# Patient Record
Sex: MALE | Race: WHITE | Employment: OTHER | URBAN - METROPOLITAN AREA
[De-identification: names, ages, dates, MRNs, and addresses within clinical notes are randomized per-mention and may not be internally consistent; named-entity substitution may affect disease eponyms.]

---

## 2017-11-09 ENCOUNTER — HOSPITAL ENCOUNTER (INPATIENT)
Age: 37
LOS: 2 days | Discharge: HOME | DRG: 773 | End: 2017-11-11
Attending: PSYCHIATRY & NEUROLOGY | Admitting: PSYCHIATRY & NEUROLOGY
Payer: MEDICAID

## 2017-11-09 PROBLEM — F19.959 SUBSTANCE OR MEDICATION-INDUCED PSYCHOTIC DISORDER (HCC): Status: ACTIVE | Noted: 2017-11-09

## 2017-11-09 LAB
ALBUMIN SERPL-MCNC: 4.3 G/DL (ref 3.5–5.2)
ALBUMIN/GLOBULIN RATIO: ABNORMAL (ref 1–2.5)
ALP BLD-CCNC: 75 U/L (ref 40–129)
ALT SERPL-CCNC: 22 U/L (ref 5–41)
ANION GAP SERPL CALCULATED.3IONS-SCNC: 10 MMOL/L (ref 9–17)
AST SERPL-CCNC: 27 U/L
BILIRUB SERPL-MCNC: 0.63 MG/DL (ref 0.3–1.2)
BUN BLDV-MCNC: 11 MG/DL (ref 6–20)
BUN/CREAT BLD: ABNORMAL (ref 9–20)
CALCIUM SERPL-MCNC: 9.4 MG/DL (ref 8.6–10.4)
CHLORIDE BLD-SCNC: 103 MMOL/L (ref 98–107)
CO2: 30 MMOL/L (ref 20–31)
CREAT SERPL-MCNC: 0.69 MG/DL (ref 0.7–1.2)
GFR AFRICAN AMERICAN: >60 ML/MIN
GFR NON-AFRICAN AMERICAN: >60 ML/MIN
GFR SERPL CREATININE-BSD FRML MDRD: ABNORMAL ML/MIN/{1.73_M2}
GFR SERPL CREATININE-BSD FRML MDRD: ABNORMAL ML/MIN/{1.73_M2}
GLUCOSE BLD-MCNC: 89 MG/DL (ref 70–99)
POTASSIUM SERPL-SCNC: 4.8 MMOL/L (ref 3.7–5.3)
SODIUM BLD-SCNC: 143 MMOL/L (ref 135–144)
THYROXINE, FREE: 1.23 NG/DL (ref 0.93–1.7)
TOTAL PROTEIN: 7.8 G/DL (ref 6.4–8.3)
TSH SERPL DL<=0.05 MIU/L-ACNC: 0.87 MIU/L (ref 0.3–5)

## 2017-11-09 PROCEDURE — 80053 COMPREHEN METABOLIC PANEL: CPT

## 2017-11-09 PROCEDURE — 36415 COLL VENOUS BLD VENIPUNCTURE: CPT

## 2017-11-09 PROCEDURE — 84443 ASSAY THYROID STIM HORMONE: CPT

## 2017-11-09 PROCEDURE — 84439 ASSAY OF FREE THYROXINE: CPT

## 2017-11-09 PROCEDURE — 6370000000 HC RX 637 (ALT 250 FOR IP): Performed by: PSYCHIATRY & NEUROLOGY

## 2017-11-09 PROCEDURE — 93005 ELECTROCARDIOGRAM TRACING: CPT

## 2017-11-09 PROCEDURE — 1240000000 HC EMOTIONAL WELLNESS R&B

## 2017-11-09 PROCEDURE — 84481 FREE ASSAY (FT-3): CPT

## 2017-11-09 RX ORDER — NICOTINE 21 MG/24HR
1 PATCH, TRANSDERMAL 24 HOURS TRANSDERMAL DAILY
Status: DISCONTINUED | OUTPATIENT
Start: 2017-11-09 | End: 2017-11-10 | Stop reason: ALTCHOICE

## 2017-11-09 RX ORDER — BUPRENORPHINE AND NALOXONE 8; 2 MG/1; MG/1
1 FILM, SOLUBLE BUCCAL; SUBLINGUAL 2 TIMES DAILY
Status: ON HOLD | COMMUNITY
End: 2017-11-11

## 2017-11-09 RX ORDER — NICOTINE 21 MG/24HR
1 PATCH, TRANSDERMAL 24 HOURS TRANSDERMAL DAILY
Status: DISCONTINUED | OUTPATIENT
Start: 2017-11-09 | End: 2017-11-09

## 2017-11-09 RX ORDER — HYDROXYZINE HYDROCHLORIDE 25 MG/1
25 TABLET, FILM COATED ORAL 3 TIMES DAILY PRN
Status: DISCONTINUED | OUTPATIENT
Start: 2017-11-09 | End: 2017-11-11 | Stop reason: HOSPADM

## 2017-11-09 RX ORDER — ACETAMINOPHEN 325 MG/1
325 TABLET ORAL EVERY 8 HOURS PRN
Status: DISCONTINUED | OUTPATIENT
Start: 2017-11-09 | End: 2017-11-09

## 2017-11-09 RX ORDER — MAGNESIUM HYDROXIDE/ALUMINUM HYDROXICE/SIMETHICONE 120; 1200; 1200 MG/30ML; MG/30ML; MG/30ML
30 SUSPENSION ORAL 3 TIMES DAILY PRN
Status: DISCONTINUED | OUTPATIENT
Start: 2017-11-09 | End: 2017-11-11 | Stop reason: HOSPADM

## 2017-11-09 RX ORDER — HYDROXYZINE HYDROCHLORIDE 25 MG/1
25 TABLET, FILM COATED ORAL 3 TIMES DAILY PRN
Status: DISCONTINUED | OUTPATIENT
Start: 2017-11-09 | End: 2017-11-09

## 2017-11-09 RX ORDER — TRAZODONE HYDROCHLORIDE 50 MG/1
50 TABLET ORAL NIGHTLY PRN
Status: DISCONTINUED | OUTPATIENT
Start: 2017-11-09 | End: 2017-11-11 | Stop reason: HOSPADM

## 2017-11-09 RX ORDER — DIVALPROEX SODIUM 500 MG/1
500 TABLET, EXTENDED RELEASE ORAL NIGHTLY
Status: DISCONTINUED | OUTPATIENT
Start: 2017-11-09 | End: 2017-11-09

## 2017-11-09 RX ORDER — ACETAMINOPHEN 325 MG/1
325 TABLET ORAL EVERY 8 HOURS PRN
Status: DISCONTINUED | OUTPATIENT
Start: 2017-11-09 | End: 2017-11-11 | Stop reason: HOSPADM

## 2017-11-09 RX ORDER — ZOLPIDEM TARTRATE 10 MG/1
TABLET ORAL NIGHTLY PRN
Status: ON HOLD | COMMUNITY
End: 2017-11-11 | Stop reason: HOSPADM

## 2017-11-09 RX ORDER — DIVALPROEX SODIUM 500 MG/1
500 TABLET, EXTENDED RELEASE ORAL NIGHTLY
Status: DISCONTINUED | OUTPATIENT
Start: 2017-11-09 | End: 2017-11-11 | Stop reason: HOSPADM

## 2017-11-09 RX ADMIN — TRAZODONE HYDROCHLORIDE 50 MG: 50 TABLET ORAL at 21:38

## 2017-11-09 RX ADMIN — ACETAMINOPHEN 325 MG: 325 TABLET, FILM COATED ORAL at 21:38

## 2017-11-09 RX ADMIN — HYDROXYZINE HYDROCHLORIDE 25 MG: 25 TABLET, FILM COATED ORAL at 21:38

## 2017-11-09 RX ADMIN — DIVALPROEX SODIUM 500 MG: 500 TABLET, EXTENDED RELEASE ORAL at 21:38

## 2017-11-09 RX ADMIN — BREXPIPRAZOLE 1 MG: 1 TABLET ORAL at 21:41

## 2017-11-09 ASSESSMENT — PAIN SCALES - GENERAL
PAINLEVEL_OUTOF10: 4
PAINLEVEL_OUTOF10: 0
PAINLEVEL_OUTOF10: 3

## 2017-11-09 ASSESSMENT — SLEEP AND FATIGUE QUESTIONNAIRES
DIFFICULTY ARISING: YES
AVERAGE NUMBER OF SLEEP HOURS: 6
DIFFICULTY FALLING ASLEEP: YES
DIFFICULTY STAYING ASLEEP: YES
RESTFUL SLEEP: YES
SLEEP PATTERN: DIFFICULTY FALLING ASLEEP
DO YOU USE A SLEEP AID: YES
DO YOU HAVE DIFFICULTY SLEEPING: NO

## 2017-11-09 ASSESSMENT — PAIN DESCRIPTION - LOCATION: LOCATION: GENERALIZED

## 2017-11-09 ASSESSMENT — PAIN DESCRIPTION - PAIN TYPE: TYPE: ACUTE PAIN

## 2017-11-09 ASSESSMENT — LIFESTYLE VARIABLES: HISTORY_ALCOHOL_USE: NO

## 2017-11-10 LAB
EKG ATRIAL RATE: 66 BPM
EKG P AXIS: 55 DEGREES
EKG P-R INTERVAL: 162 MS
EKG Q-T INTERVAL: 412 MS
EKG QRS DURATION: 92 MS
EKG QTC CALCULATION (BAZETT): 431 MS
EKG R AXIS: 40 DEGREES
EKG T AXIS: 50 DEGREES
EKG VENTRICULAR RATE: 66 BPM
T3 FREE: 3.37 PG/ML (ref 2.02–4.43)

## 2017-11-10 PROCEDURE — 1240000000 HC EMOTIONAL WELLNESS R&B

## 2017-11-10 PROCEDURE — 6370000000 HC RX 637 (ALT 250 FOR IP): Performed by: PSYCHIATRY & NEUROLOGY

## 2017-11-10 RX ORDER — BUPRENORPHINE AND NALOXONE 4; 1 MG/1; MG/1
1 FILM, SOLUBLE BUCCAL; SUBLINGUAL 2 TIMES DAILY
Status: DISCONTINUED | OUTPATIENT
Start: 2017-11-10 | End: 2017-11-11 | Stop reason: HOSPADM

## 2017-11-10 RX ADMIN — TRAZODONE HYDROCHLORIDE 50 MG: 50 TABLET ORAL at 21:09

## 2017-11-10 RX ADMIN — ACETAMINOPHEN 325 MG: 325 TABLET, FILM COATED ORAL at 08:59

## 2017-11-10 RX ADMIN — DIVALPROEX SODIUM 500 MG: 500 TABLET, EXTENDED RELEASE ORAL at 21:09

## 2017-11-10 RX ADMIN — NICOTINE POLACRILEX 2 MG: 2 GUM, CHEWING BUCCAL at 12:18

## 2017-11-10 RX ADMIN — ACETAMINOPHEN 325 MG: 325 TABLET, FILM COATED ORAL at 21:09

## 2017-11-10 RX ADMIN — HYDROXYZINE HYDROCHLORIDE 25 MG: 25 TABLET, FILM COATED ORAL at 08:59

## 2017-11-10 RX ADMIN — NICOTINE POLACRILEX 2 MG: 2 GUM, CHEWING BUCCAL at 21:09

## 2017-11-10 RX ADMIN — NICOTINE POLACRILEX 2 MG: 2 GUM, CHEWING BUCCAL at 18:01

## 2017-11-10 RX ADMIN — NICOTINE POLACRILEX 2 MG: 2 GUM, CHEWING BUCCAL at 15:50

## 2017-11-10 RX ADMIN — BREXPIPRAZOLE 1 MG: 1 TABLET ORAL at 08:59

## 2017-11-10 RX ADMIN — BUPRENORPHINE HYDROCHLORIDE, NALOXONE HYDROCHLORIDE 1 FILM: 4; 1 FILM, SOLUBLE BUCCAL; SUBLINGUAL at 17:03

## 2017-11-10 RX ADMIN — HYDROXYZINE HYDROCHLORIDE 25 MG: 25 TABLET, FILM COATED ORAL at 21:09

## 2017-11-10 ASSESSMENT — PAIN DESCRIPTION - LOCATION
LOCATION: GENERALIZED
LOCATION: GENERALIZED

## 2017-11-10 ASSESSMENT — PAIN SCALES - GENERAL
PAINLEVEL_OUTOF10: 0
PAINLEVEL_OUTOF10: 5
PAINLEVEL_OUTOF10: 4
PAINLEVEL_OUTOF10: 4
PAINLEVEL_OUTOF10: 6
PAINLEVEL_OUTOF10: 6

## 2017-11-10 ASSESSMENT — LIFESTYLE VARIABLES: HISTORY_ALCOHOL_USE: NO

## 2017-11-10 ASSESSMENT — PAIN DESCRIPTION - PAIN TYPE
TYPE: ACUTE PAIN
TYPE: ACUTE PAIN

## 2017-11-10 NOTE — BH NOTE
patch  1 patch Transdermal Daily Casey Fermin MD   Stopped at 11/10/17 0903    brexpiprazole (REXULTI) tablet 1 mg  1 mg Oral Daily Casey Fermin MD   1 mg at 11/10/17 0859     [unfilled]    H&P  Labs  Encourage to attend groups  Supportive therapy  ELOS: 5-7 days      Electronically signed by Casey Fermin MD on 11/10/2017 at 10:17 AM

## 2017-11-10 NOTE — FLOWSHEET NOTE
11/10/17 0930   Encounter Summary   Services provided to: Patient   Referral/Consult From: Rounding   Continue Visiting (11/10/17)   Complexity of Encounter Low   Length of Encounter 15 minutes   Routine   Type Initial   Spiritual/Protestant   Type Spiritual support   Sacraments   Sacrament of Sick-Anointing Anointed  (Fr. Leveda Cockayne 11/10/17)

## 2017-11-10 NOTE — PLAN OF CARE
Problem: Altered Mood, Depressive Behavior  Goal: LTG-Able to verbalize and/or display a decrease in depressive symptoms  Outcome: Ongoing  Therapeutic Recreation Refusal  Pt did not participate in journalling at 1100 despite staff encouragement.

## 2017-11-10 NOTE — BH NOTE
`[facility]  Admission Note     Admission Type:   Admission Type: Voluntary    Reason for admission:  Reason for Admission: Manic, Paranoid, increased depression, suicidal thoughts, afraid people are after him    PATIENT STRENGTHS:  Strengths: Medication Compliance    Patient Strengths and Limitations:  Limitations: Inappropriate/potentially harmful leisure interests, Difficulty problem solving/relies on others to help solve problems    Addictive Behavior:   Addictive Behavior  In the past 3 months, have you felt or has someone told you that you have a problem with:  : None  Do you have a history of Chemical Use?: No  Do you have a history of Alcohol Use?: No  Do you have a history of Street Drug Abuse?: Yes  Histroy of Prescripton Drug Abuse?: No    Medical Problems:   Past Medical History:   Diagnosis Date    Asthma     Hepatitis C     Scoliosis     Staph infection 9/2012    left knee       Status EXAM:  Status and Exam  Normal: No  Affect: Blunt  Level of Consciousness: Alert  Mood:Normal: No  Mood: Depressed, Anxious  Motor Activity:Normal: No  Motor Activity: Increased  Interview Behavior: Cooperative  Preception: [place] to Person, [place] to Time, [place] to Place, [place] to Situation  Attention:Normal: No  Attention: Distractible, Hyperalert  Thought Content:Normal: No  Thought Content: Preoccupations  Hallucinations: None  Delusions: No  Memory:Normal: Yes  Insight and Judgment: No  Insight and Judgment: Poor Insight, Poor Judgment  Present Suicidal Ideation: Yes  Present Homicidal Ideation: No    Tobacco Screening:  Practical Counseling, on admission, smith X, if applicable and completed (first 3 are required if patient doesn't refuse):             ( x)  Recognizing danger situations (included triggers and roadblocks)                    (x )  Coping skills (new ways to manage stress, exercise, relaxation techniques, changing routine, distraction)

## 2017-11-10 NOTE — PLAN OF CARE
Problem: Altered Mood, Psychotic Behavior  Intervention: Psychotic behavior assessment  Pt declined to attend psychotherapy at 1000 am despite encouragement.   Pt offered 1:1.

## 2017-11-10 NOTE — PLAN OF CARE
Problem: Altered Mood, Depressive Behavior  Goal: LTG-Able to verbalize and/or display a decrease in depressive symptoms  Outcome: Ongoing  PSYCHOEDUCATION GROUP NOTE    Date: 11/10/17  Start Time: 1345  End Time: 1425    Number Participants in Group:  8    Goal:  Patient will demonstrate increased interpersonal interaction   Topic: Music therapy and communication    Discipline Responsible:   OT  AT  [facility]. x RT MHP Other       Participation Level:     None  Minimal    Active Listener x Interactive    Monopolizing         Participation Quality:  x Appropriate  Inappropriate   x       Attentive        Intrusive          Sharing        Resistant          Supportive        Lethargic       Affective:   x Congruent  Incongruent  Blunted  Flat    Constricted  Anxious  Elated  Angry    Labile  Depressed  Other         Cognitive:  x Alert x Oriented PPTP     Concentration  G x F  P   Attention Span  G x F  P   Short-Term Memory  G x F  P   Long-Term Memory  G x F  P   ProblemSolving/  Decision Making  G x F  P   Ability to Process  Information  G x F  P      Contributing Factors             Delusional             Hallucinating             Flight of Ideas             Other:       Modes of Intervention:  x Education x Support  Exploration    Clarifying  Problem Solving  Confrontation   x Socialization x Limit Setting  Reality Testing   x Activity  Movement x Media    Other:            Response to Learning:  x Able to verbalize current knowledge/experience   x Able to verbalize/acknowledge new learning   x Able to retain information   x Capable of insight   x Able to change behavior   x Progressing to goal    Other:        Comments: Pt attended group and participated.
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
## 2017-11-11 VITALS
WEIGHT: 238 LBS | OXYGEN SATURATION: 99 % | HEART RATE: 67 BPM | SYSTOLIC BLOOD PRESSURE: 112 MMHG | TEMPERATURE: 98.1 F | DIASTOLIC BLOOD PRESSURE: 61 MMHG | BODY MASS INDEX: 30.54 KG/M2 | RESPIRATION RATE: 14 BRPM | HEIGHT: 74 IN

## 2017-11-11 PROCEDURE — 6370000000 HC RX 637 (ALT 250 FOR IP): Performed by: PSYCHIATRY & NEUROLOGY

## 2017-11-11 PROCEDURE — 5130000000 HC BRIDGE APPOINTMENT: Performed by: COUNSELOR

## 2017-11-11 RX ORDER — BUPRENORPHINE AND NALOXONE 8; 2 MG/1; MG/1
1 FILM, SOLUBLE BUCCAL; SUBLINGUAL 2 TIMES DAILY
Qty: 14 EACH | Refills: 0 | Status: SHIPPED | OUTPATIENT
Start: 2017-11-11

## 2017-11-11 RX ORDER — TRAZODONE HYDROCHLORIDE 100 MG/1
100 TABLET ORAL NIGHTLY
Qty: 30 TABLET | Refills: 0 | Status: SHIPPED | OUTPATIENT
Start: 2017-11-11

## 2017-11-11 RX ORDER — DIVALPROEX SODIUM 500 MG/1
500 TABLET, EXTENDED RELEASE ORAL NIGHTLY
Qty: 30 TABLET | Refills: 0 | Status: SHIPPED | OUTPATIENT
Start: 2017-11-11

## 2017-11-11 RX ADMIN — NICOTINE POLACRILEX 2 MG: 2 GUM, CHEWING BUCCAL at 08:41

## 2017-11-11 RX ADMIN — BREXPIPRAZOLE 1 MG: 1 TABLET ORAL at 08:41

## 2017-11-11 RX ADMIN — HYDROXYZINE HYDROCHLORIDE 25 MG: 25 TABLET, FILM COATED ORAL at 08:41

## 2017-11-11 RX ADMIN — BUPRENORPHINE HYDROCHLORIDE, NALOXONE HYDROCHLORIDE 1 FILM: 4; 1 FILM, SOLUBLE BUCCAL; SUBLINGUAL at 08:41

## 2017-11-11 ASSESSMENT — PAIN SCALES - GENERAL: PAINLEVEL_OUTOF10: 0
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
## 2017-11-11 NOTE — BH NOTE
[facility]  Discharge Note    Pt discharged with followings belongings:   Dentures: None  Vision - Corrective Lenses: None  Hearing Aid: None  Jewelry: Earrings  Body Piercings Removed: No  Clothing: Footwear, Jacket / coat, Pants, Shirt, Socks, Undergarments (Comment)  Were All Patient Medications Collected?: Not Applicable  Other Valuables: None   Valuables sent home with patient. Valuables retrieved from safe, Security envelope number: [id number] and returned to patient. Patient left department with Departure Mode: By self, In cab via Mobility at Departure: Ambulatory, discharged to Discharged to: Private Residence. Patient education on aftercare instructions: complete  Information faxed to [facility] by RN Patient verbalize understanding of AVS.  Despite smoking cessation education patient is not interested in quitting at this time.     Status EXAM upon discharge:  Status and Exam  Normal: No  Facial Expression: Flat  Affect: Appropriate  Level of Consciousness: Alert  Mood:Normal: No  Mood: Depressed, Anxious  Motor Activity:Normal: No  Motor Activity: Decreased  Interview Behavior: Cooperative  Preception: [place] to Person, [place] to Time, [place] to Place, [place] to Situation  Attention:Normal: Yes  Attention: Distractible  Thought Processes: Blocking  Thought Content:Normal: No  Thought Content: [place] of Content  Hallucinations: None  Delusions: No  Memory:Normal: No  Memory: Poor Recent, Poor Remote  Insight and Judgment: No  Insight and Judgment: Poor Judgment, Poor Insight, Unmotivated  Present Suicidal Ideation: No  Present Homicidal Ideation: No    Daly Cheng RN

## 2017-11-11 NOTE — PROGRESS NOTES
Wellness Group Note    Date: 11/11/17  Start Time: 1100  End Time: 1130    Number Participants in Group:  9/18    Goal:  Patient will demonstrate increased interpersonal interaction   Topic: yoga & walking    Discipline Responsible: BHT      Participation Level:     None  Minimal    Active Listener  X Interactive    Monopolizing         Participation Quality:   Appropriate  Inappropriate     X       Attentive        Intrusive     X       Sharing        Resistant     X       Supportive        Lethargic       Affective:     X Congruent  Incongruent  Blunted  Flat    Constricted  Anxious  Elated  Angry    Labile  Depressed  Other         Cognitive:    X  Alert   Oriented PPTP     Concentration   X   G    F    P   Attention Span   X   G    F    P   Short-Term Memory   X   G    F    P   Long-Term Memory   X   G    F    P   ProblemSolving   X   G    F    P   Ability to Process Info   X   G    F    P      Contributing Factors             Delusional             Hallucinating             Flight of Ideas             Other:       Modes of Intervention:    X Education    Support  Exploration    Clarifying  Problem Solving  Confrontation     X Socialization  Limit Setting  Reality Testing    Activity  Movement  Media    Other:            Response to Learning:    X  Able to verbalize current knowledge/experience     Able to verbalize/acknowledge new learning     X  Able to retain information     X  Capable of insight     Able to change behavior     X  Progressing to goal     Other:        Comments:

## 2017-11-11 NOTE — PROGRESS NOTES
Pt did not attend 1330 creative expression group d/t resting in room despite staff invitation to attend

## 2017-11-11 NOTE — PLAN OF CARE
Problem: Altered Mood, Psychotic Behavior  Goal: LTG-Able to verbalize reality based thinking  Outcome: Ongoing  Patient is able to verbalize reality based thinking.

## 2017-11-11 NOTE — PLAN OF CARE
Problem: Altered Mood, Depressive Behavior  Goal: STG-Able to verbalize suicidal ideations  Outcome: Ongoing  Patient was not able to verbalize suicidal thoughts at this time.
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
## 2019-03-01 NOTE — PLAN OF CARE
Problem: Altered Mood, Psychotic Behavior  Goal: LTG-Able to verbalize reality based thinking  Outcome: Ongoing  Psychoeducation Group Note    Date: 11/10/17  Start Time: 1430  End Time: 1500    Number Participants in Group:  10    Goal:  Patient will demonstrate increased interpersonal interaction. Topic:  Walking/stretching    Discipline Responsible:   OT  AT  [facility]. X RT  Other       Participation Level:     None  Minimal   x Active Listener x Interactive    Monopolizing         Participation Quality:  x Appropriate  Inappropriate   x       Attentive        Intrusive   x       Sharing        Resistant          Supportive        Lethargic       Affective:   x Congruent  Incongruent  Blunted  Flat    Constricted  Anxious  Elated  Angry    Labile  Depressed  Other  Bright       Cognitive:  x Alert x Oriented PPTP     Concentration x G  F  P   Attention Span x G  F  P   Short-Term Memory  G  F  P   Long-Term Memory  G  F  P   ProblemSolving/  Decision Making x G  F  P   Ability to Process  Information x G  F  P      Contributing Factors             Delusional             Hallucinating             Flight of Ideas             Other:       Modes of Intervention:   Education  Support  Exploration    Clarifying  Problem Solving x Confrontation   x Socialization  Limit Setting  Reality Testing   x Activity x Movement x Media    Other:            Response to Learning:  x Able to verbalize current knowledge/experience   x Able to verbalize/acknowledge new learning    Able to retain information    Capable of insight   x Able to change behavior   x Progressing to goal    Other:        Comments: pt attended group and participated. denies

## 2023-05-10 NOTE — PLAN OF CARE
Problem: Altered Mood, Depressive Behavior  Goal: LTG-Able to verbalize and/or display a decrease in depressive symptoms  Outcome: Ongoing  [facility]  Initial Interdisciplinary Treatment Plan NO      Original treatment plan Date & Time: 11/10/17 0836    Admission Type:  Admission Type: Voluntary    Reason for admission:   Reason for Admission: Manic, Paranoid, increased depression, suicidal thoughts, afraid people are after him    Estimated Length of Stay:  5-7days  Estimated Discharge Date: to be determined by physician    PATIENT STRENGTHS:  Patient Strengths:Strengths: Medication Compliance  Patient Strengths and Limitations:Limitations: Inappropriate/potentially harmful leisure interests, Difficulty problem solving/relies on others to help solve problems  Addictive Behavior: Addictive Behavior  In the past 3 months, have you felt or has someone told you that you have a problem with:  : None  Do you have a history of Chemical Use?: No  Do you have a history of Alcohol Use?: No  Do you have a history of Street Drug Abuse?: Yes  Histroy of Prescripton Drug Abuse?: No  Medical Problems:  Past Medical History:   Diagnosis Date    Asthma     Hepatitis C     Scoliosis     Staph infection 9/2012    left knee     Status EXAM:Status and Exam  Normal: No  Affect: Blunt  Level of Consciousness: Alert  Mood:Normal: No  Mood: Depressed, Anxious  Motor Activity:Normal: No  Motor Activity: Increased  Interview Behavior: Cooperative  Preception: [place] to Person, [place] to Time, [place] to Place, [place] to Situation  Attention:Normal: No  Attention: Distractible, Hyperalert  Thought Content:Normal: No  Thought Content: Preoccupations  Hallucinations: None  Delusions: No  Memory:Normal: Yes  Insight and Judgment: No  Insight and Judgment: Poor Insight, Poor Judgment  Present Suicidal Ideation: Yes  Present Homicidal Ideation: No    EDUCATION:   Learner Progress Toward Treatment Goals: reviewed group plans and Thalidomide Counseling: I discussed with the patient the risks of thalidomide including but not limited to birth defects, anxiety, weakness, chest pain, dizziness, cough and severe allergy.